# Patient Record
Sex: FEMALE | Race: OTHER | HISPANIC OR LATINO | Employment: UNEMPLOYED | URBAN - METROPOLITAN AREA
[De-identification: names, ages, dates, MRNs, and addresses within clinical notes are randomized per-mention and may not be internally consistent; named-entity substitution may affect disease eponyms.]

---

## 2022-01-07 ENCOUNTER — APPOINTMENT (EMERGENCY)
Dept: CT IMAGING | Facility: HOSPITAL | Age: 57
End: 2022-01-07
Payer: COMMERCIAL

## 2022-01-07 ENCOUNTER — HOSPITAL ENCOUNTER (EMERGENCY)
Facility: HOSPITAL | Age: 57
Discharge: HOME | End: 2022-01-07
Attending: EMERGENCY MEDICINE
Payer: COMMERCIAL

## 2022-01-07 VITALS
DIASTOLIC BLOOD PRESSURE: 53 MMHG | RESPIRATION RATE: 18 BRPM | HEART RATE: 67 BPM | OXYGEN SATURATION: 100 % | SYSTOLIC BLOOD PRESSURE: 115 MMHG | TEMPERATURE: 97.7 F

## 2022-01-07 DIAGNOSIS — S09.90XA CLOSED HEAD INJURY, INITIAL ENCOUNTER: Primary | ICD-10-CM

## 2022-01-07 DIAGNOSIS — S13.9XXA NECK SPRAIN, INITIAL ENCOUNTER: ICD-10-CM

## 2022-01-07 DIAGNOSIS — S30.0XXA CONTUSION OF LOWER BACK, INITIAL ENCOUNTER: ICD-10-CM

## 2022-01-07 PROCEDURE — 70450 CT HEAD/BRAIN W/O DYE: CPT

## 2022-01-07 PROCEDURE — 72131 CT LUMBAR SPINE W/O DYE: CPT

## 2022-01-07 PROCEDURE — 96372 THER/PROPH/DIAG INJ SC/IM: CPT

## 2022-01-07 PROCEDURE — 99284 EMERGENCY DEPT VISIT MOD MDM: CPT | Performed by: EMERGENCY MEDICINE

## 2022-01-07 PROCEDURE — 72125 CT NECK SPINE W/O DYE: CPT

## 2022-01-07 PROCEDURE — 99284 EMERGENCY DEPT VISIT MOD MDM: CPT

## 2022-01-07 PROCEDURE — G1004 CDSM NDSC: HCPCS

## 2022-01-07 RX ORDER — KETOROLAC TROMETHAMINE 10 MG/1
10 TABLET, FILM COATED ORAL EVERY 6 HOURS PRN
Qty: 8 TABLET | Refills: 0 | Status: SHIPPED | OUTPATIENT
Start: 2022-01-07

## 2022-01-07 RX ORDER — KETOROLAC TROMETHAMINE 30 MG/ML
30 INJECTION, SOLUTION INTRAMUSCULAR; INTRAVENOUS ONCE
Status: COMPLETED | OUTPATIENT
Start: 2022-01-07 | End: 2022-01-07

## 2022-01-07 RX ADMIN — KETOROLAC TROMETHAMINE 30 MG: 30 INJECTION, SOLUTION INTRAMUSCULAR; INTRAVENOUS at 18:02

## 2022-01-07 NOTE — ED PROVIDER NOTES
History  Chief Complaint   Patient presents with    Head Injury     Patient arrvied via EMS, per EMS patient fell out of a snow tube, reports head strike, negative, negative LOC  Patient reports neck and back pain  43-year-old female presents emergency room status post falling out of a snow tube [place]  The patient denies loss of consciousness and complains of slight headache as well as mild neck pain  She also admits to right low back pain but no abdominal pain chest pain or shortness of breath  The patient was brought by ambulance for evaluation  Patient arrives back boarded and collared  None       History reviewed  No pertinent past medical history  History reviewed  No pertinent surgical history  History reviewed  No pertinent family history  I have reviewed and agree with the history as documented  E-Cigarette/Vaping     E-Cigarette/Vaping Substances     Social History     Tobacco Use    Smoking status: Never Smoker    Smokeless tobacco: Never Used   Substance Use Topics    Alcohol use: Not Currently    Drug use: Not Currently       Review of Systems   Constitutional: Negative for chills and fever  HENT: Negative for ear pain and sore throat  Eyes: Negative for pain and visual disturbance  Respiratory: Negative for cough and shortness of breath  Cardiovascular: Negative for chest pain and palpitations  Gastrointestinal: Negative for abdominal pain and vomiting  Genitourinary: Negative for dysuria and hematuria  Musculoskeletal: Positive for arthralgias, back pain, myalgias and neck pain  Skin: Negative for color change and rash  Neurological: Negative for seizures and syncope  All other systems reviewed and are negative  Physical Exam  Physical Exam  Vitals and nursing note reviewed  Constitutional:       General: She is not in acute distress  Appearance: Normal appearance   She is well-developed  HENT:      Head: Normocephalic and atraumatic  Comments: No hematoma appreciated or bony step-offs     Right Ear: External ear normal       Left Ear: External ear normal       Ears:      Comments: No Mora's or raccoon sign noted  Nose: Nose normal       Comments: No septal hematoma noted     Mouth/Throat:      Mouth: Mucous membranes are moist    Eyes:      Extraocular Movements: Extraocular movements intact  Conjunctiva/sclera: Conjunctivae normal       Pupils: Pupils are equal, round, and reactive to light  Neck:      Comments: Patient with mild midline cervical spinous tenderness from C2 to through C4 also paracervical tenderness noted bilaterally  Patient will mild tenderness noted to the right paralumbar so spine  Cardiovascular:      Rate and Rhythm: Normal rate and regular rhythm  Pulses: Normal pulses  Heart sounds: Normal heart sounds  No murmur heard  Pulmonary:      Effort: Pulmonary effort is normal  No respiratory distress  Breath sounds: Normal breath sounds  Abdominal:      General: Bowel sounds are normal       Palpations: Abdomen is soft  Tenderness: There is no abdominal tenderness  Musculoskeletal:         General: No swelling or deformity  Cervical back: Neck supple  Tenderness present  Skin:     General: Skin is warm and dry  Capillary Refill: Capillary refill takes less than 2 seconds  Neurological:      General: No focal deficit present  Mental Status: She is alert and oriented to person, place, and time  Mental status is at baseline  Sensory: No sensory deficit  Motor: No weakness     Psychiatric:         Mood and Affect: Mood normal          Vital Signs  ED Triage Vitals   Temperature Pulse Respirations Blood Pressure SpO2   01/07/22 1555 01/07/22 1555 01/07/22 1553 01/07/22 1555 01/07/22 1555   97 7 °F (36 5 °C) 64 18 112/75 99 %      Temp Source Heart Rate Source Patient Position - Orthostatic VS BP Location FiO2 (%)   01/07/22 1555 01/07/22 1555 01/07/22 1555 01/07/22 1555 --   Oral Monitor Lying Left arm       Pain Score       --                  Vitals:    01/07/22 1555 01/07/22 1730 01/07/22 1800   BP: 112/75 98/59 115/53   Pulse: 64 56 67   Patient Position - Orthostatic VS: Lying Lying Sitting         Visual Acuity      ED Medications  Medications   ketorolac (TORADOL) injection 30 mg (30 mg Intramuscular Given 1/7/22 1802)       Diagnostic Studies  Results Reviewed     None                 CT head without contrast   Final Result by Loyda Fragoso DO (01/07 1658)      No acute intracranial abnormality  Workstation performed: [id number]         CT cervical spine without contrast   Final Result by Loyda Fragoso DO (01/07 1702)   No cervical spine fracture or traumatic malalignment  Workstation performed: [id number]         CT lumbar spine without contrast   Final Result by Merced Valverde MD (01/07 1724)      No acute osseous abnormality of lumbar spine  Mild multilevel degenerative changes of lumbar spine, as detailed above  Workstation performed: [id number]                    Procedures  Procedures         ED Course  ED Course as of 01/08/22 0004   Claudetta Bode Jan 07, 2022   1617 Patient notes that she is not pregnant as she is not menopausal    1756 Patient ambulated in the emergency room without much difficulty  All CT scans show no evidence of acute fracture  Patient still has no abdominal pain on evaluation  Patient will be discharged on Toradol as needed for pain                                               MDM    Disposition  Final diagnoses:   Closed head injury, initial encounter   Neck sprain, initial encounter   Contusion of lower back, initial encounter     Time reflects when diagnosis was documented in both MDM as applicable and the Disposition within this note     Time User Action Codes Description Comment    1/7/2022  5:57 PM Ashu Wilson Add [S09 90XA] Closed head injury, initial encounter     1/7/2022  5:57 PM Brutico, Mp Dub Add [S13  9XXA] Neck sprain, initial encounter     1/7/2022  5:57 PM Brutico, Mp Dub Add [S30  0XXA] Contusion of lower back, initial encounter       ED Disposition     ED Disposition Condition Date/Time Comment    Discharge Stable Fri Jan 7, 2022  6:38  [address] discharge to home/self care  Follow-up Information    None         Discharge Medication List as of 1/7/2022  6:38 PM      START taking these medications    Details   ketorolac (TORADOL) 10 mg tablet Take 1 tablet (10 mg total) by mouth every 6 (six) hours as needed for moderate pain for up to 8 doses, Starting Fri 1/7/2022, Print             No discharge procedures on file      PDMP Review     None          ED Provider  Electronically Signed by           Jodi iMnor DO  01/08/22 0004

## 2022-01-07 NOTE — DISCHARGE INSTRUCTIONS
Use Toradol 1 pill every 6 hours as needed for pain  Do not take Motrin, Advil, ibuprofen, Aleve, Naprosyn, or aspirin while on Toradol  You may take additional Tylenol if needed  Ice to sore areas 4 to 6 times a day for 10-15 minutes at a time as needed  Return to the ER for any new, concerning, worsening issues